# Patient Record
Sex: FEMALE | Race: WHITE | URBAN - METROPOLITAN AREA
[De-identification: names, ages, dates, MRNs, and addresses within clinical notes are randomized per-mention and may not be internally consistent; named-entity substitution may affect disease eponyms.]

---

## 2023-01-24 ENCOUNTER — OFFICE VISIT (OUTPATIENT)
Dept: URBAN - METROPOLITAN AREA CLINIC 91 | Facility: CLINIC | Age: 73
End: 2023-01-24

## 2023-01-24 DIAGNOSIS — B30.9 VIRAL CONJUNCTIVITIS: Primary | ICD-10-CM

## 2023-01-24 PROCEDURE — 99203 OFFICE O/P NEW LOW 30 MIN: CPT | Performed by: SPECIALIST

## 2023-01-24 RX ORDER — MOXIFLOXACIN HYDROCHLORIDE 5 MG/ML
0.5 % SOLUTION/ DROPS OPHTHALMIC
Qty: 6 | Refills: 1 | Status: ACTIVE
Start: 2023-01-24

## 2023-01-24 NOTE — IMPRESSION/PLAN
Impression: Viral conjunctivitis: B30.9. Plan: Patient reports no improvement with Ofloxacin. Patient advised to discontinue and start Vigamox QID OU. Patient referred to ER due to lightheadedness and hx of UTI.  Offered to call 911 but patient insisted on  taking her to [facility] [place].